# Patient Record
Sex: MALE | Race: WHITE | HISPANIC OR LATINO | Employment: OTHER | URBAN - METROPOLITAN AREA
[De-identification: names, ages, dates, MRNs, and addresses within clinical notes are randomized per-mention and may not be internally consistent; named-entity substitution may affect disease eponyms.]

---

## 2024-09-05 ENCOUNTER — TELEPHONE (OUTPATIENT)
Dept: FAMILY MEDICINE CLINIC | Facility: CLINIC | Age: 64
End: 2024-09-05

## 2024-09-05 ENCOUNTER — OFFICE VISIT (OUTPATIENT)
Dept: FAMILY MEDICINE CLINIC | Facility: CLINIC | Age: 64
End: 2024-09-05

## 2024-09-05 VITALS
HEART RATE: 91 BPM | HEIGHT: 65 IN | WEIGHT: 166.8 LBS | RESPIRATION RATE: 18 BRPM | SYSTOLIC BLOOD PRESSURE: 152 MMHG | OXYGEN SATURATION: 97 % | TEMPERATURE: 97.6 F | BODY MASS INDEX: 27.79 KG/M2 | DIASTOLIC BLOOD PRESSURE: 94 MMHG

## 2024-09-05 DIAGNOSIS — F33.1 MODERATE EPISODE OF RECURRENT MAJOR DEPRESSIVE DISORDER (HCC): ICD-10-CM

## 2024-09-05 DIAGNOSIS — R35.1 NOCTURIA: ICD-10-CM

## 2024-09-05 DIAGNOSIS — Z12.12 SCREENING FOR COLORECTAL CANCER: ICD-10-CM

## 2024-09-05 DIAGNOSIS — B18.2 CHRONIC HEPATITIS C WITHOUT HEPATIC COMA (HCC): ICD-10-CM

## 2024-09-05 DIAGNOSIS — E66.3 OVERWEIGHT (BMI 25.0-29.9): ICD-10-CM

## 2024-09-05 DIAGNOSIS — R10.84 GENERALIZED ABDOMINAL PAIN: ICD-10-CM

## 2024-09-05 DIAGNOSIS — I87.2 VENOUS STASIS DERMATITIS OF BOTH LOWER EXTREMITIES: Primary | ICD-10-CM

## 2024-09-05 DIAGNOSIS — L90.5 SKIN SCARRING/FIBROSIS: ICD-10-CM

## 2024-09-05 DIAGNOSIS — F10.10 ALCOHOL ABUSE: Chronic | ICD-10-CM

## 2024-09-05 DIAGNOSIS — Z23 ENCOUNTER FOR IMMUNIZATION: ICD-10-CM

## 2024-09-05 DIAGNOSIS — Z13.1 SCREENING FOR DIABETES MELLITUS: ICD-10-CM

## 2024-09-05 DIAGNOSIS — Z12.11 SCREENING FOR COLORECTAL CANCER: ICD-10-CM

## 2024-09-05 PROBLEM — T14.8XXA SKIN EXCORIATION: Status: ACTIVE | Noted: 2024-09-05

## 2024-09-05 PROBLEM — F14.10 COCAINE ABUSE (HCC): Status: ACTIVE | Noted: 2018-06-19

## 2024-09-05 PROBLEM — L03.115 CELLULITIS OF RIGHT LOWER EXTREMITY: Status: RESOLVED | Noted: 2018-08-28 | Resolved: 2024-09-05

## 2024-09-05 PROBLEM — Z72.0 TOBACCO ABUSE: Chronic | Status: ACTIVE | Noted: 2018-06-18

## 2024-09-05 PROBLEM — Z12.5 ENCOUNTER FOR PROSTATE CANCER SCREENING: Status: ACTIVE | Noted: 2024-09-05

## 2024-09-05 PROBLEM — F19.20 PSYCHOACTIVE SUBSTANCE DEPENDENCE (HCC): Status: ACTIVE | Noted: 2017-05-24

## 2024-09-05 PROBLEM — F19.10 IV DRUG ABUSE (HCC): Chronic | Status: ACTIVE | Noted: 2018-06-18

## 2024-09-05 PROBLEM — F19.10 DRUG ABUSE (HCC): Status: RESOLVED | Noted: 2018-08-28 | Resolved: 2024-09-05

## 2024-09-05 PROBLEM — I10 PRIMARY HYPERTENSION: Status: ACTIVE | Noted: 2024-09-05

## 2024-09-05 PROBLEM — E87.6 HYPOKALEMIA: Status: RESOLVED | Noted: 2018-08-28 | Resolved: 2024-09-05

## 2024-09-05 RX ORDER — AMMONIUM LACTATE 12 G/100G
CREAM TOPICAL AS NEEDED
Qty: 385 G | Refills: 3 | Status: SHIPPED | OUTPATIENT
Start: 2024-09-05

## 2024-09-05 NOTE — PROGRESS NOTES
Ambulatory Visit  Name: Yogi Schumacher      : 1960      MRN: [id number]  Encounter Provider: Rachna Adames MD  Encounter Date: 2024   Encounter department: [facility]    Assessment & Plan   1. Venous stasis dermatitis of both lower extremities  Assessment & Plan:  Bilateral venous stasis dermatitis of lower extremities that has not been responding to Vaseline.  Patient followed with wound care in the past due to presence of cellulitis and drainage.    PLAN:  -Referral to dermatology per patient request  -Apply AmLactin cream to bilateral lower extremity  Orders:  -     Ambulatory Referral to Dermatology; Future  -     ammonium lactate (LAC-HYDRIN) 12 % cream; Apply topically as needed for dry skin  2. Moderate episode of recurrent major depressive disorder (HCC)  Assessment & Plan:  PHQ-9 = 11 which corresponds with moderate depression.  Patient states he used to be on Seroquel in the past.  No SI/HI    PLAN:  -Per patient request, would defer management to psychiatric services that he will be meeting today.  Orders:  -     Vitamin D 25 hydroxy; Future  3. Nocturia  Assessment & Plan:  Increased urination at nighttime.  Patient concerned about enlarged prostate but has no family history of prostate cancer or BPH.  Other  symptoms denied.  Deferred DAJUAN    -PSA  Orders:  -     PSA, Total Screen; Future  4. Alcohol abuse  5. Chronic hepatitis C without hepatic coma (HCC)  Assessment & Plan:  Chronic hep C.  Treated.  HCV VIRAL LOAD in 2022 undetectable.  Not engaged in high risk behavior since this last lab..    -Will continue to monitor  -Declined HIV blood work and all the hepatitis blood work because he was tested while incarcerated  6. Skin scarring/fibrosis  Assessment & Plan:  Multiple regions of skin scarring on bilateral forearms and bilateral lower extremities.  Scarring secondary to chronic IV drug use in the past.    -Referral to dermatology per  "patient request  Orders:  -     Ambulatory Referral to Dermatology; Future  7. Generalized abdominal pain  Assessment & Plan:  Nonspecific generalized abdominal pain.  Exam benign today.    -Will obtain lab studies per patient request  Orders:  -     Comprehensive metabolic panel; Future  -     TSH, 3rd generation with Free T4 reflex; Future  8. Screening for colorectal cancer  -     Cologuard  9. Screening for diabetes mellitus  -     Hemoglobin A1C; Future  10. Encounter for immunization  -     Pneumococcal Conjugate Vaccine 20-valent (Pcv20)  -     influenza vaccine, recombinant, PF, 0.5 mL IM (Flublok)  11. Overweight (BMI 25.0-29.9)  -     Comprehensive metabolic panel; Future  -     Lipid Panel with Direct LDL reflex; Future         History of Present Illness   {Disappearing Hyperlinks I Encounters * My Last Note * Since Last Visit * History :08947}  Yogi Schumacher is a 64 y.o. male with a past medical history of HTN, polysubstance abuse, hepatitis C, depression presenting to the clinic today to establish care.  Patient was recently incarcerated and was released from care home in July 2024.  He had been receiving care while in care home and would like to follow-up.  He does not have his records with him but states he needs to sign a records release form.  He mentioned that he did some test while he was in care home to mention his kidney is functioning at a \"45% level\".  He also underwent an echocardiogram and MRI of unnamed region but does not have results.    Patient further complains of right and left lower quadrant abdominal pain that occurs intermittently.  Symptoms began a few months ago and is not associated with meals.  No changes to bowel movements noted.    He endorses numbness in his right upper arm that he associates to a procedure that was done in [place] months ago.  He states he feels when decision was made in his right medial upper arm, a tendon was probably caught causing him to have movement " disorders and his nails falling out during the period.  He has since returned to baseline but still feels the numbness in his right upper arm.    Regarding his history of polysubstance abuse, patient was using cocaine, heroin and fentanyl.  He stopped IV drug use 3 years ago and the last time he snorted fentanyl was May 2023.  Patient currently follows with a psychiatrist on [place] and will be seeing them at 5 PM today.  Of note, patient states he was previously on Seroquel.      Review of Systems   Constitutional:  Negative for activity change, appetite change, fatigue, fever and unexpected weight change.   HENT:  Negative for mouth sores, sore throat and trouble swallowing.    Eyes:  Negative for discharge, redness and visual disturbance.   Respiratory:  Negative for cough, chest tightness, shortness of breath and wheezing.    Cardiovascular:  Negative for chest pain, palpitations and leg swelling.   Gastrointestinal:  Positive for abdominal pain. Negative for blood in stool, constipation, diarrhea, nausea and vomiting.   Genitourinary:  Positive for frequency. Negative for decreased urine volume, difficulty urinating, dysuria, flank pain, hematuria and urgency.   Musculoskeletal:  Negative for arthralgias and back pain.   Skin:  Positive for rash. Negative for wound.   Neurological:  Negative for dizziness, tremors, syncope, weakness, light-headedness, numbness and headaches.   Psychiatric/Behavioral:  Positive for dysphoric mood. Negative for agitation, behavioral problems, hallucinations, sleep disturbance and suicidal ideas. The patient is not nervous/anxious.      Past Medical History:   Diagnosis Date   • Alcohol abuse    • Anxiety    • Depression    • Hepatitis C    • Primary hypertension 09/05/2024   • Substance abuse (HCC)      No past surgical history on file.  Family History   Problem Relation Age of Onset   • Diabetes Mother    • Hypertension Father      Social History     Tobacco Use   • Smoking  "status: Every Day     Current packs/day: 0.30     Average packs/day: 0.3 packs/day for 45.0 years (13.5 ttl pk-yrs)     Types: Cigarettes     Start date: 8/20/1979   • Smokeless tobacco: Never   Vaping Use   • Vaping status: Never Used   Substance and Sexual Activity   • Alcohol use: Not Currently     Alcohol/week: 84.0 standard drinks of alcohol     Types: 84 Cans of beer per week     Comment: pt. reports he quit 4 days ago   • Drug use: Not Currently     Types: Cocaine, Heroin, Fentanyl   • Sexual activity: Not Currently     Current Outpatient Medications on File Prior to Visit   Medication Sig   • aspirin 81 MG tablet Take 81 mg by mouth   • Multiple Vitamin (MULTIVITAMIN PO) Take 1 tablet by mouth     No Known Allergies  Immunization History   Administered Date(s) Administered   • COVID-19 MODERNA VACC 0.5 ML IM 04/04/2021, 05/01/2021   • Influenza Recombinant Preservative Free Im 09/05/2024   • Pneumococcal Conjugate Vaccine 20-valent (Pcv20), Polysace 09/05/2024     Objective   {Disappearing Hyperlinks   Review Vitals * Enter New Vitals * Results Review * Labs * Imaging * Cardiology * Procedures * Lung Cancer Screening :89409}  /94 (BP Location: Right arm, Patient Position: Sitting, Cuff Size: Standard)   Pulse 91   Temp 97.6 °F (36.4 °C) (Temporal)   Resp 18   Ht 5' 5\" (1.651 m)   Wt 75.7 kg (166 lb 12.8 oz)   SpO2 97%   BMI 27.76 kg/m²     Physical Exam  Vitals and nursing note reviewed.   Constitutional:       General: He is not in acute distress.     Appearance: He is well-developed. He is not ill-appearing.   HENT:      Head: Normocephalic and atraumatic.      Comments: Dentures (upper and lower)     Right Ear: External ear normal. There is no impacted cerumen.      Left Ear: External ear normal. There is no impacted cerumen.      Nose: Nose normal. No congestion or rhinorrhea.      Mouth/Throat:      Mouth: Mucous membranes are moist.      Pharynx: No oropharyngeal exudate or posterior " oropharyngeal erythema.   Eyes:      General: No scleral icterus.        Right eye: No discharge.         Left eye: No discharge.      Conjunctiva/sclera: Conjunctivae normal.   Cardiovascular:      Rate and Rhythm: Normal rate and regular rhythm.      Heart sounds: Normal heart sounds. No murmur heard.  Pulmonary:      Effort: Pulmonary effort is normal. No respiratory distress.      Breath sounds: Normal breath sounds.   Abdominal:      General: Abdomen is flat. There is no distension.      Palpations: Abdomen is soft.      Tenderness: There is no abdominal tenderness. There is no guarding.   Musculoskeletal:         General: No swelling, tenderness or deformity. Normal range of motion.      Cervical back: Normal range of motion and neck supple.      Right lower leg: No edema.      Left lower leg: No edema.   Skin:     General: Skin is warm and dry.      Capillary Refill: Capillary refill takes less than 2 seconds.      Coloration: Skin is not jaundiced.      Comments: Venous stasis changes in bilateral lower extremities with multiple scarring.  Multiple scars on bilateral forearm from previous IV drug use (see pics)   Neurological:      General: No focal deficit present.      Mental Status: He is alert and oriented to person, place, and time.   Psychiatric:         Mood and Affect: Mood normal.                   Depression Screening Follow-up Plan: Patient's depression screening was positive with a PHQ-2 score of 6. Their PHQ-9 score was 11. Continue regular follow-up with their psychologist/therapist/psychiatrist who is managing their mental health condition(s).

## 2024-09-05 NOTE — LETTER
September 5, 2024     Patient: Yogi Schumacher  YOB: 1960  Date of Visit: 9/5/2024      To Whom it May Concern:    Yogi Schumacher is under my professional care. Yogi was seen in my office on 9/5/2024.     If you have any questions or concerns, please don't hesitate to call.         Sincerely,          Rachna Adames MD        CC: No Recipients

## 2024-09-05 NOTE — TELEPHONE ENCOUNTER
Sent MRO request to Nathalia ZEPEDA on 9/5/24. Request was scanned into chart and faxed to Office of past treatment.

## 2024-09-06 NOTE — ASSESSMENT & PLAN NOTE
Bilateral venous stasis dermatitis of lower extremities that has not been responding to Vaseline.  Patient followed with wound care in the past due to presence of cellulitis and drainage.    PLAN:  -Referral to dermatology per patient request  -Apply AmLactin cream to bilateral lower extremity

## 2024-09-06 NOTE — ASSESSMENT & PLAN NOTE
Increased urination at nighttime.  Patient concerned about enlarged prostate but has no family history of prostate cancer or BPH.  Other  symptoms denied.  Deferred DAJUAN    -PSA

## 2024-09-06 NOTE — ASSESSMENT & PLAN NOTE
Chronic hep C.  Treated.  HCV VIRAL LOAD in 6/2022 undetectable.  Not engaged in high risk behavior since this last lab..    -Will continue to monitor  -Declined HIV blood work and all the hepatitis blood work because he was tested while incarcerated

## 2024-09-06 NOTE — ASSESSMENT & PLAN NOTE
Multiple regions of skin scarring on bilateral forearms and bilateral lower extremities.  Scarring secondary to chronic IV drug use in the past.    -Referral to dermatology per patient request

## 2024-09-06 NOTE — ASSESSMENT & PLAN NOTE
Nonspecific generalized abdominal pain.  Exam benign today.    -Will obtain lab studies per patient request

## 2024-09-06 NOTE — ASSESSMENT & PLAN NOTE
PHQ-9 = 11 which corresponds with moderate depression.  Patient states he used to be on Seroquel in the past.  No SI/HI    PLAN:  -Per patient request, would defer management to psychiatric services that he will be meeting today.

## 2024-09-11 ENCOUNTER — TELEPHONE (OUTPATIENT)
Age: 64
End: 2024-09-11

## 2024-09-11 NOTE — TELEPHONE ENCOUNTER
Pt called to schedule as New patient with routine referral for Venous stasis dermatitis of both lower extremities. Advised our next available appointment at [place] would be in August 2025. Suggested to patient to contact his pcp or ins for other derm groups.  Pt verbally understood.

## 2024-10-01 ENCOUNTER — TELEPHONE (OUTPATIENT)
Dept: FAMILY MEDICINE CLINIC | Facility: CLINIC | Age: 64
End: 2024-10-01

## 2024-10-01 NOTE — TELEPHONE ENCOUNTER
Received MRO request from  [facility]  received on 9/27/24. Request was scanned into chart and faxed to MRO.

## 2024-10-05 PROBLEM — Z12.5 ENCOUNTER FOR PROSTATE CANCER SCREENING: Status: RESOLVED | Noted: 2024-09-05 | Resolved: 2024-10-05

## 2025-03-31 ENCOUNTER — APPOINTMENT (EMERGENCY)
Dept: CT IMAGING | Facility: HOSPITAL | Age: 65
End: 2025-03-31
Payer: COMMERCIAL

## 2025-03-31 ENCOUNTER — HOSPITAL ENCOUNTER (EMERGENCY)
Facility: HOSPITAL | Age: 65
Discharge: HOME | End: 2025-03-31
Attending: EMERGENCY MEDICINE
Payer: COMMERCIAL

## 2025-03-31 VITALS
RESPIRATION RATE: 20 BRPM | BODY MASS INDEX: 24.98 KG/M2 | WEIGHT: 150.13 LBS | SYSTOLIC BLOOD PRESSURE: 155 MMHG | HEART RATE: 80 BPM | OXYGEN SATURATION: 94 % | TEMPERATURE: 98.4 F | DIASTOLIC BLOOD PRESSURE: 95 MMHG
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
## 2025-03-31 PROCEDURE — 99284 EMERGENCY DEPT VISIT MOD MDM: CPT

## 2025-03-31 PROCEDURE — 70450 CT HEAD/BRAIN W/O DYE: CPT

## 2025-03-31 RX ORDER — ACETAMINOPHEN 325 MG/1
650 TABLET ORAL ONCE
Status: COMPLETED | OUTPATIENT
Start: 2025-03-31 | End: 2025-03-31

## 2025-03-31 RX ADMIN — ACETAMINOPHEN 650 MG: 325 TABLET, FILM COATED ORAL at 10:56

## 2025-03-31 NOTE — Clinical Note
Yogi Schumacher was seen and treated in our emergency department on 3/31/2025.    No restrictions            Diagnosis:     Yogi  .    He may return on this date: 04/01/2025         If you have any questions or concerns, please don't hesitate to call.      Ike Larios PA-C    ______________________________           _______________          _______________  Hospital Representative                              Date                                Time
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
